# Patient Record
Sex: FEMALE | Race: WHITE | NOT HISPANIC OR LATINO
[De-identification: names, ages, dates, MRNs, and addresses within clinical notes are randomized per-mention and may not be internally consistent; named-entity substitution may affect disease eponyms.]

---

## 2022-05-19 ENCOUNTER — NON-APPOINTMENT (OUTPATIENT)
Age: 62
End: 2022-05-19

## 2022-06-06 ENCOUNTER — NON-APPOINTMENT (OUTPATIENT)
Age: 62
End: 2022-06-06

## 2022-07-04 ENCOUNTER — NON-APPOINTMENT (OUTPATIENT)
Age: 62
End: 2022-07-04

## 2022-11-23 ENCOUNTER — NON-APPOINTMENT (OUTPATIENT)
Age: 62
End: 2022-11-23

## 2022-11-23 ENCOUNTER — APPOINTMENT (OUTPATIENT)
Dept: NEUROSURGERY | Facility: CLINIC | Age: 62
End: 2022-11-23

## 2022-11-23 VITALS
TEMPERATURE: 97.3 F | SYSTOLIC BLOOD PRESSURE: 106 MMHG | RESPIRATION RATE: 17 BRPM | OXYGEN SATURATION: 98 % | HEART RATE: 79 BPM | WEIGHT: 150 LBS | HEIGHT: 62 IN | BODY MASS INDEX: 27.6 KG/M2 | DIASTOLIC BLOOD PRESSURE: 68 MMHG

## 2022-11-23 PROCEDURE — 99204 OFFICE O/P NEW MOD 45 MIN: CPT

## 2022-11-23 NOTE — PHYSICAL EXAM
[General Appearance - Alert] : alert [General Appearance - In No Acute Distress] : in no acute distress [Oriented To Time, Place, And Person] : oriented to person, place, and time [Impaired Insight] : insight and judgment were intact [Affect] : the affect was normal [Memory Recent] : recent memory was not impaired [Person] : oriented to person [Place] : oriented to place [Time] : oriented to time [Short Term Intact] : short term memory intact [Cranial Nerves Optic (II)] : visual acuity intact bilaterally,  pupils equal round and reactive to light [Cranial Nerves Oculomotor (III)] : extraocular motion intact [Cranial Nerves Trigeminal (V)] : facial sensation intact symmetrically [Cranial Nerves Facial (VII)] : face symmetrical [Cranial Nerves Vestibulocochlear (VIII)] : hearing was intact bilaterally [Cranial Nerves Glossopharyngeal (IX)] : tongue and palate midline [Cranial Nerves Accessory (XI - Cranial And Spinal)] : head turning and shoulder shrug symmetric [Cranial Nerves Hypoglossal (XII)] : there was no tongue deviation with protrusion [Motor Tone] : muscle tone was normal in all four extremities [Motor Strength] : muscle strength was normal in all four extremities [Motor Handedness Left-Handed] : the patient is left hand dominant [5] : C7 triceps 5/5 [Sensation Tactile Decrease] : light touch was intact [Sclera] : the sclera and conjunctiva were normal [PERRL With Normal Accommodation] : pupils were equal in size, round, reactive to light, with normal accommodation [Extraocular Movements] : extraocular movements were intact [Full Visual Field] : full visual field [Hearing Threshold Finger Rub Not Caroline] : hearing was normal [Neck Appearance] : the appearance of the neck was normal [] : no respiratory distress [Respiration, Rhythm And Depth] : normal respiratory rhythm and effort [Abnormal Walk] : normal gait [Skin Color & Pigmentation] : normal skin color and pigmentation [Maddox] : Maddox's sign was not demonstrated

## 2022-11-23 NOTE — HISTORY OF PRESENT ILLNESS
[de-identified] : 61 y/o left handed female, former smoker with PMHx asthma who presents today with recent finding of 1 cm lesion along right parietal convexity on CT done 9/10/22 in workup for headaches. \par \par Pt endorses chronic headaches that have progressively worsened over the past few weeks, pain starts the back of her neck and travels up behind her right ear and to around right eye. Headaches occur a few times a week. She noticed one day also with right eye swelling during headache x 1 occurrence so she went to PCP who ordered her CT for further workup. Her PCP started her on migraine medication- pt unsure of name- which has helped her. \par \par Pt also notes Intermittent neck pain with headaches that does not travel down her arms. Denies upper extremity weakness, numbness/tinging. \par She notes some intermittent balance issues, notices she will sway left when walking at times. Denies falls. \par \par Denies vision changes, seizure activities, other new/worsening focal neuro deficits. \par

## 2022-11-23 NOTE — REVIEW OF SYSTEMS
[As Noted in HPI] : as noted in HPI [Fever] : no fever [Chills] : no chills [Confused or Disoriented] : no confusion [Seizures] : no convulsions [Dizziness] : no dizziness [Eyesight Problems] : no eyesight problems [Chest Pain] : no chest pain [Palpitations] : no palpitations [Shortness Of Breath] : no shortness of breath

## 2022-11-23 NOTE — ASSESSMENT
[FreeTextEntry1] : 62-year-old female found to have incidental right parietal 1 cm calcified meningioma on imaging for work-up of headaches.  Has not yet seen neurology regarding headaches.  I explained that at this size, the lesion is unlikely to be causing her headaches.  I also explained that given its calcification, the likelihood of progression is low.  We will reimage in 6 months to assess for growth rate.\par \par Dr. D' Amico independently reviewed all available images with patient. \par \par PLAN: \par - Reimage with MRI brain w/wo 6 months \par - Referral to neurologist\par - RTC after MRI to review \par \par Patient verbalizes understanding of today’s discussion and next steps in treatment plan. \par \par  \par A total of 45 minutes was spent reviewing the labs, imaging and physical examination of the patient. We discussed the diagnosis, and the plan. The patient's questions were answered. The patient demonstrated an excellent understanding of the plan.

## 2022-12-04 ENCOUNTER — NON-APPOINTMENT (OUTPATIENT)
Age: 62
End: 2022-12-04

## 2023-02-25 ENCOUNTER — NON-APPOINTMENT (OUTPATIENT)
Age: 63
End: 2023-02-25

## 2023-03-21 ENCOUNTER — APPOINTMENT (OUTPATIENT)
Dept: MRI IMAGING | Facility: HOSPITAL | Age: 63
End: 2023-03-21
Payer: MEDICAID

## 2023-03-21 ENCOUNTER — OUTPATIENT (OUTPATIENT)
Dept: OUTPATIENT SERVICES | Facility: HOSPITAL | Age: 63
LOS: 1 days | End: 2023-03-21
Payer: MEDICAID

## 2023-03-21 PROCEDURE — 70553 MRI BRAIN STEM W/O & W/DYE: CPT

## 2023-03-21 PROCEDURE — A9585: CPT

## 2023-03-21 PROCEDURE — 70553 MRI BRAIN STEM W/O & W/DYE: CPT | Mod: 26

## 2023-03-28 PROBLEM — R51.9 HEADACHE: Status: ACTIVE | Noted: 2022-11-23

## 2023-03-29 ENCOUNTER — APPOINTMENT (OUTPATIENT)
Dept: NEUROSURGERY | Facility: CLINIC | Age: 63
End: 2023-03-29
Payer: MEDICAID

## 2023-03-29 DIAGNOSIS — R51.9 HEADACHE, UNSPECIFIED: ICD-10-CM

## 2023-03-29 PROCEDURE — 99212 OFFICE O/P EST SF 10 MIN: CPT | Mod: 95

## 2023-03-29 NOTE — HISTORY OF PRESENT ILLNESS
[FreeTextEntry1] : 63 y/o left handed female, former smoker with PMHx asthma who presented for evaluation of 1 cm lesion along right parietal convexity on CT done 9/10/22 in workup for headaches. \par \par 11/23/22 Initial presentation: \par - Pt endorses chronic headaches that have progressively worsened over the past few weeks, pain starts the back of her neck and travels up behind her right ear and to around right eye. Headaches occur a few times a week. She noticed one day also with right eye swelling during headache x 1 occurrence so she went to PCP who ordered her CT for further workup. Her PCP started her on migraine medication- pt unsure of name- which has helped her. \par - Pt also noted Intermittent neck pain with headaches that does not travel down her arms without weakness and intermittent balance issues, notices she will sway left when walking at times. \par - Was recommended to re-image with MRI in 6 months, referral to neurologist for headaches\par \par TODAY pt returns for follow- up and MRI review.\par Pt endorses continued headaches. She takes quilipta and advil that provides her some relief. She is scheduled to see Dr. Gregory next month for evaluation.\par Denies dizziness, seizure activities, other new/worsening focal neuro deficits. \par

## 2023-03-29 NOTE — DATA REVIEWED
[de-identified] : \par \par ACC: [id number] EXAM: MR BRAIN WAW IC ORDERED BY: BATSHEVA OSMAN\par \par PROCEDURE DATE: 03/21/2023\par \par \par \par INTERPRETATION: CLINICAL INDICATION: Brain mass.\par \par TECHNIQUE: Multi-planar multi-sequential MR imaging of the brain was performed before and after the intravenous administration of 7 ml of Gadavist.\par \par COMPARISON: MR brain 3/25/2022. CT head 9/10/2022.\par \par FINDINGS:\par \par There is a homogeneously enhancing extra-axial dural based mass along the right lateral parietal convexity measuring 1.1 x 1.1 x 0.9 cm in AP by transverse by CC dimensions (series 702 image 26 and series 701 image 137), consistent with meningioma, which is stable in size and morphology as to prior imaging, given for differences in technique. There is associated susceptibility blooming artifact reflecting intralesional calcifications. There is no evidence of associated peripheral lesional edema or parenchymal invasion.\par \par There are scattered and patchy T2/FLAIR hyperintense changes in the periventricular and subcortical white matter, nonspecific finding, most likely representing sequelae of moderate chronic microvascular ischemic disease.\par \par There is cortical sulcal prominence related to underlying mild brain parenchymal volume loss.\par \par No acute infarction, intracranial hemorrhage, or intra-axial mass.\par \par There is no evidence of hydrocephalus. There are no extra-axial fluid collections. The skull base flow voids are present.\par \par The visualized intraorbital contents are normal. There are inflammatory changes of the right frontal and ethmoid sinuses. There is a right mastoid effusion is present. The left mastoid air cells are clear. There is T2 hyperintense signal in the bilateral malar subcutaneous tissues, consistent with dermal cosmetic injections.\par \par IMPRESSION:\par \par Left parietal convexity meningioma measuring 1.1 x 1.1 x 0.9 cm.\par \par --- End of Report ---

## 2023-03-29 NOTE — ASSESSMENT
[FreeTextEntry1] : 62-year-old female found to have incidental right parietal 1 cm calcified meningioma on imaging for work-up of headaches. Stable at 1 year.  Has not yet seen neurology regarding headaches.  I explained that at this size, the lesion is unlikely to be causing her headaches.  I also explained that given its calcification, the likelihood of progression is low.  We will reimage in 1 year to assess for growth rate.\par \par Dr. D' Amico independently reviewed all available images with patient. \par \par PLAN: \par - See neurologist Dr. Gregory for headache evaluation next month as scheduled\par - Repeat MRI brain w/wo 1 year\par - RTC after MRI to review \par \par Patient verbalizes understanding of today’s discussion and next steps in treatment plan. \par \par \par A total of 15 minutes was spent reviewing the labs, imaging and physical examination of the patient. We discussed the diagnosis, and the plan. The patient's questions were answered. The patient demonstrated an excellent understanding of the plan.

## 2023-03-29 NOTE — REASON FOR VISIT
[Home] : at home, [unfilled] , at the time of the visit. [Medical Office: ([facility])___] : at the medical office located in  [Patient] : the patient [FreeTextEntry1] : brain mass, review 6 month MRI

## 2023-04-26 ENCOUNTER — APPOINTMENT (OUTPATIENT)
Dept: NEUROLOGY | Facility: CLINIC | Age: 63
End: 2023-04-26
Payer: MEDICARE

## 2023-04-26 VITALS
TEMPERATURE: 97.8 F | BODY MASS INDEX: 27.6 KG/M2 | SYSTOLIC BLOOD PRESSURE: 109 MMHG | OXYGEN SATURATION: 96 % | HEIGHT: 62 IN | HEART RATE: 77 BPM | WEIGHT: 150 LBS | DIASTOLIC BLOOD PRESSURE: 72 MMHG

## 2023-04-26 DIAGNOSIS — G43.909 MIGRAINE, UNSPECIFIED, NOT INTRACTABLE, W/OUT STATUS MIGRAINOSUS: ICD-10-CM

## 2023-04-26 PROCEDURE — 99205 OFFICE O/P NEW HI 60 MIN: CPT

## 2023-04-26 RX ORDER — GABAPENTIN 300 MG/1
300 CAPSULE ORAL
Qty: 30 | Refills: 3 | Status: ACTIVE | COMMUNITY
Start: 2023-04-26 | End: 1900-01-01

## 2023-04-26 RX ORDER — OMEGA-3/DHA/EPA/FISH OIL 300-1000MG
400 CAPSULE ORAL
Qty: 30 | Refills: 5 | Status: ACTIVE | COMMUNITY
Start: 2023-04-26 | End: 1900-01-01

## 2023-09-13 ENCOUNTER — APPOINTMENT (OUTPATIENT)
Dept: NEUROSURGERY | Facility: CLINIC | Age: 63
End: 2023-09-13
Payer: MEDICARE

## 2023-09-13 VITALS
BODY MASS INDEX: 27.6 KG/M2 | DIASTOLIC BLOOD PRESSURE: 76 MMHG | HEIGHT: 62 IN | RESPIRATION RATE: 18 BRPM | SYSTOLIC BLOOD PRESSURE: 117 MMHG | HEART RATE: 65 BPM | WEIGHT: 150 LBS | OXYGEN SATURATION: 96 % | TEMPERATURE: 97.7 F

## 2023-09-13 DIAGNOSIS — Z87.891 PERSONAL HISTORY OF NICOTINE DEPENDENCE: ICD-10-CM

## 2023-09-13 PROCEDURE — 99212 OFFICE O/P EST SF 10 MIN: CPT

## 2024-02-15 ENCOUNTER — APPOINTMENT (OUTPATIENT)
Dept: NEUROLOGY | Facility: CLINIC | Age: 64
End: 2024-02-15

## 2024-04-09 ENCOUNTER — RX RENEWAL (OUTPATIENT)
Age: 64
End: 2024-04-09

## 2024-04-09 RX ORDER — RIMEGEPANT SULFATE 75 MG/75MG
75 TABLET, ORALLY DISINTEGRATING ORAL
Qty: 8 | Refills: 10 | Status: ACTIVE | COMMUNITY
Start: 2023-04-26 | End: 1900-01-01

## 2024-05-22 PROBLEM — G93.89 BRAIN MASS: Status: ACTIVE | Noted: 2022-11-23

## 2024-05-28 PROBLEM — D32.9 MENINGIOMA: Status: ACTIVE | Noted: 2022-11-23

## 2024-05-29 ENCOUNTER — APPOINTMENT (OUTPATIENT)
Dept: NEUROSURGERY | Facility: CLINIC | Age: 64
End: 2024-05-29
Payer: MEDICARE

## 2024-05-29 VITALS
BODY MASS INDEX: 27.6 KG/M2 | TEMPERATURE: 97.6 F | WEIGHT: 150 LBS | HEIGHT: 62 IN | SYSTOLIC BLOOD PRESSURE: 93 MMHG | OXYGEN SATURATION: 96 % | RESPIRATION RATE: 18 BRPM | HEART RATE: 61 BPM | DIASTOLIC BLOOD PRESSURE: 62 MMHG

## 2024-05-29 DIAGNOSIS — R26.89 OTHER ABNORMALITIES OF GAIT AND MOBILITY: ICD-10-CM

## 2024-05-29 DIAGNOSIS — G93.89 OTHER SPECIFIED DISORDERS OF BRAIN: ICD-10-CM

## 2024-05-29 DIAGNOSIS — G44.86 CERVICOGENIC HEADACHE: ICD-10-CM

## 2024-05-29 DIAGNOSIS — D32.9 BENIGN NEOPLASM OF MENINGES, UNSPECIFIED: ICD-10-CM

## 2024-05-29 PROCEDURE — 99212 OFFICE O/P EST SF 10 MIN: CPT

## 2024-05-29 NOTE — PHYSICAL EXAM
[Oriented To Time, Place, And Person] : oriented to person, place, and time [Impaired Insight] : insight and judgment were intact [Affect] : the affect was normal [Memory Recent] : recent memory was not impaired [Person] : oriented to person [Place] : oriented to place [Time] : oriented to time [Short Term Intact] : short term memory intact [Cranial Nerves Optic (II)] : visual acuity intact bilaterally,  pupils equal round and reactive to light [Cranial Nerves Oculomotor (III)] : extraocular motion intact [Cranial Nerves Trigeminal (V)] : facial sensation intact symmetrically [Cranial Nerves Facial (VII)] : face symmetrical [Cranial Nerves Vestibulocochlear (VIII)] : hearing was intact bilaterally [Cranial Nerves Glossopharyngeal (IX)] : tongue and palate midline [Cranial Nerves Accessory (XI - Cranial And Spinal)] : head turning and shoulder shrug symmetric [Cranial Nerves Hypoglossal (XII)] : there was no tongue deviation with protrusion [Motor Tone] : muscle tone was normal in all four extremities [Motor Strength] : muscle strength was normal in all four extremities [Motor Handedness Left-Handed] : the patient is left hand dominant [5] : C7 triceps 5/5 [Sensation Tactile Decrease] : light touch was intact [Sclera] : the sclera and conjunctiva were normal [Neck Appearance] : the appearance of the neck was normal [] : no respiratory distress [Respiration, Rhythm And Depth] : normal respiratory rhythm and effort [Abnormal Walk] : normal gait [Skin Color & Pigmentation] : normal skin color and pigmentation [Maddox] : Maddox's sign was not demonstrated

## 2024-05-29 NOTE — REVIEW OF SYSTEMS
[As Noted in HPI] : as noted in HPI [Fever] : no fever [Chills] : no chills [Arm Weakness] : no arm weakness [Hand Weakness] : no hand weakness [Numbness] : no numbness [Tingling] : no tingling [Eyesight Problems] : no eyesight problems [Chest Pain] : no chest pain [Palpitations] : no palpitations [Shortness Of Breath] : no shortness of breath [Incontinence] : no incontinence

## 2024-05-29 NOTE — ASSESSMENT
[FreeTextEntry1] : 62-year-old female found to have incidental right parietal 1 cm calcified meningioma on imaging for work-up of headaches. Stable now at 1+ year.  Has not yet seen neurology regarding headaches.  I explained that at this size, the lesion is unlikely to be causing her headaches.  I also explained that given its calcification, the likelihood of progression is low.  We will reimage in 1 year to assess for growth rate.  Dr. D'Amico independently reviewed all available images with patient.   PLAN: - MRI cervical spine - Continue f/u with neurology Dr. Gregory for headache management  - Will plan to repeat MRI brain 1 year - RTC after MRI for review   Patient verbalizes understanding of today's discussion and next steps in treatment plan.  A total of 15 minutes was spent reviewing the labs, imaging and physical examination of the patient. We discussed the diagnosis, and the plan. The patient's questions were answered. The patient demonstrated an excellent understanding of the plan.

## 2024-05-29 NOTE — HISTORY OF PRESENT ILLNESS
[FreeTextEntry1] : 63 y/o left handed female, former smoker with PMHx asthma who presented for evaluation of 1 cm lesion along right parietal convexity on CT done 9/10/22 in workup for headaches.  11/23/22 Initial presentation: - Pt endorses chronic headaches that have progressively worsened over the past few weeks, pain starts the back of her neck and travels up behind her right ear and to around right eye. Headaches occur a few times a week. She noticed one day also with right eye swelling during headache x 1 occurrence so she went to PCP who ordered her CT for further workup. Her PCP started her on migraine medication- pt unsure of name- which has helped her. - Pt also noted Intermittent neck pain with headaches that does not travel down her arms without weakness and intermittent balance issues, notices she will sway left when walking at times. - Was recommended to re-image with MRI in 6 months, referral to neurologist for headaches  3/29/23 pt returned for f/u. MRI done 3/21/23 which was stable at 1 year. Recommended to repeat MRI 1 year.  9/13/23 pt returned for f/u and review of MRI ordered by her PCP which was stable.  Continues to have headaches, on Qulipta and Ubrelvy which help her. Recommended to repeat MRI 1 year.   Returns TODAY for f/u and review of recent MRI, MRI ordered by her PCP.  4/19/24 MRI done @ [facility] with report of stable 1.1 cm meningioma at right parietal vertex.  She endorses neck pain that travels up the back of her head causing her headaches.  Qulipta and Ubrelvy not helping as much as it used to. Some relief with Advil.  Notes intermittent balance issues, but Denies weakness of upper extremities, dexterity issues, bowel/bladder issues.   Neurologist: Shagufta Gregory

## 2025-02-27 ENCOUNTER — NON-APPOINTMENT (OUTPATIENT)
Age: 65
End: 2025-02-27

## 2025-02-27 ENCOUNTER — APPOINTMENT (OUTPATIENT)
Dept: SURGERY | Facility: CLINIC | Age: 65
End: 2025-02-27
Payer: MEDICARE

## 2025-02-27 VITALS
BODY MASS INDEX: 27.97 KG/M2 | HEIGHT: 62 IN | SYSTOLIC BLOOD PRESSURE: 122 MMHG | OXYGEN SATURATION: 97 % | HEART RATE: 80 BPM | WEIGHT: 152 LBS | DIASTOLIC BLOOD PRESSURE: 72 MMHG

## 2025-02-27 DIAGNOSIS — M62.08 SEPARATION OF MUSCLE (NONTRAUMATIC), OTHER SITE: ICD-10-CM

## 2025-02-27 DIAGNOSIS — Z82.49 FAMILY HISTORY OF ISCHEMIC HEART DISEASE AND OTHER DISEASES OF THE CIRCULATORY SYSTEM: ICD-10-CM

## 2025-02-27 DIAGNOSIS — F17.210 NICOTINE DEPENDENCE, CIGARETTES, UNCOMPLICATED: ICD-10-CM

## 2025-02-27 PROCEDURE — 99203 OFFICE O/P NEW LOW 30 MIN: CPT

## 2025-02-27 RX ORDER — FLUTICASONE FUROATE, UMECLIDINIUM BROMIDE AND VILANTEROL TRIFENATATE 200; 62.5; 25 UG/1; UG/1; UG/1
POWDER RESPIRATORY (INHALATION)
Refills: 0 | Status: ACTIVE | COMMUNITY

## 2025-02-27 RX ORDER — PANTOPRAZOLE SODIUM 40 MG/1
40 GRANULE, DELAYED RELEASE ORAL
Refills: 0 | Status: ACTIVE | COMMUNITY

## 2025-02-27 RX ORDER — RIMEGEPANT SULFATE 75 MG/75MG
75 TABLET, ORALLY DISINTEGRATING ORAL
Refills: 0 | Status: ACTIVE | COMMUNITY

## 2025-02-27 RX ORDER — ATOGEPANT 60 MG/1
60 TABLET ORAL
Refills: 0 | Status: ACTIVE | COMMUNITY

## 2025-02-27 RX ORDER — BUPROPION HYDROCHLORIDE 300 MG/1
300 TABLET, EXTENDED RELEASE ORAL
Refills: 0 | Status: ACTIVE | COMMUNITY

## 2025-02-27 RX ORDER — ESCITALOPRAM OXALATE 10 MG/1
10 TABLET, FILM COATED ORAL
Refills: 0 | Status: ACTIVE | COMMUNITY

## 2025-02-27 RX ORDER — ALBUTEROL 90 MCG
AEROSOL (GRAM) INHALATION
Refills: 0 | Status: ACTIVE | COMMUNITY

## 2025-05-22 ENCOUNTER — APPOINTMENT (OUTPATIENT)
Dept: SURGERY | Facility: CLINIC | Age: 65
End: 2025-05-22
Payer: MEDICARE

## 2025-05-22 VITALS
HEIGHT: 62 IN | WEIGHT: 152 LBS | OXYGEN SATURATION: 96 % | DIASTOLIC BLOOD PRESSURE: 72 MMHG | SYSTOLIC BLOOD PRESSURE: 113 MMHG | HEART RATE: 63 BPM | BODY MASS INDEX: 27.97 KG/M2

## 2025-05-22 DIAGNOSIS — M62.08 SEPARATION OF MUSCLE (NONTRAUMATIC), OTHER SITE: ICD-10-CM

## 2025-05-22 PROCEDURE — 99213 OFFICE O/P EST LOW 20 MIN: CPT
